# Patient Record
(demographics unavailable — no encounter records)

---

## 2024-12-04 NOTE — ASSESSMENT
[FreeTextEntry1] : The patient is a 58-year-old female with left left Achilles tendinosis with thickening of the tendon.  I reviewed the examination, diagnosis, MRI and treatment options.  She is improving with physical therapy and I recommend she continue with therapy.  She understands she is a slight increased risk for rupture.  If she notices any significant sudden pain or pop she should contact me.  She will continue with therapy and follow-up me in about 2 months if her symptoms persist or worsen.  All questions were answered.  She will modify activities accordingly work with the therapist.

## 2024-12-04 NOTE — PHYSICAL EXAM
[de-identified] : Left ankle examination today demonstrates that there is thickening of the Achilles tendon a few centimeters proximal to the Achilles insertion with some minimal tenderness.  It is clinically intact. [de-identified] : A MRI report of left ankle from October 17, 2024 is interpreted as fusiform thickening of the Achilles tendon measuring up to 1 cm, consistent with moderate tendinosis.  This corresponds to the palpable area of concern.  Other findings are reported on the MRI but did not correlate the patient's symptoms.

## 2024-12-04 NOTE — HISTORY OF PRESENT ILLNESS
[FreeTextEntry1] : The patient is a 58-year-old female that notices a bump on her Achilles tendon with some pain.  She first noticed this in June.  She then noticed more symptoms in August when she was walking more.  She started physical therapy 1 month ago and has noticed an improvement.  Her symptoms are related to activity.  She notices a prominence or bump along the Achilles tendon a few centimeters proximal to the insertion.

## 2024-12-31 NOTE — PHYSICAL EXAM
[Scleral Icterus] : No Scleral Icterus [Abdominal  Ascites] : no ascites [Asterixis] : no asterixis observed [Jaundice] : No jaundice [Depression] : no depression [Hallucinations] : ~T no ~M hallucinations [General Appearance - Alert] : alert [General Appearance - In No Acute Distress] : in no acute distress [General Appearance - Well Nourished] : well nourished [General Appearance - Well Developed] : well developed [General Appearance - Well-Appearing] : healthy appearing [Sclera] : the sclera and conjunctiva were normal [] : no respiratory distress [Exaggerated Use Of Accessory Muscles For Inspiration] : no accessory muscle use [Auscultation Breath Sounds / Voice Sounds] : lungs were clear to auscultation bilaterally [Heart Rate And Rhythm] : heart rate was normal and rhythm regular [Edema] : there was no peripheral edema [Bowel Sounds] : normal bowel sounds [Abdomen Soft] : soft [Abdomen Tenderness] : non-tender [Abdomen Mass (___ Cm)] : no abdominal mass palpated [FreeTextEntry1] : central adiposity [Abnormal Walk] : normal gait [Skin Color & Pigmentation] : normal skin color and pigmentation [No Focal Deficits] : no focal deficits [Oriented To Time, Place, And Person] : oriented to person, place, and time [Affect] : the affect was normal [Impaired Insight] : insight and judgment were intact

## 2024-12-31 NOTE — HISTORY OF PRESENT ILLNESS
[FreeTextEntry1] : Pt is a 58 year old female with PMH class I obesity (BMI 32.8), HTN, HLD, hypothyroidism, who presents for initial visit for evaluation/management of abnormal liver imaging. PCP: Jovany Ferreira GI: Mallory Guzman MD Pharmacy: [facility]   PRIOR DATA: -11/26/24: Went to [facility] ED for R flank pain rad to RUQ/R groin. Advised to follow up with her GI DrWilber Guzman. WBC 5.78, Hgb 14.3, Plt 92. Na 139, K 3.6, Cr 0.91. AST/ALT 19/23, TB 0.5, ALP 58, Alb 4.6, TP 7.2. Lipase 29.  UA neg.  CT A/P w IV contrast only (to rule out kidney stone, pain rad to groin) => Diffuse heterogeneity right hepatic lobe. No biliary ductal dilatation. GB wnl, Spleen wnl. Pancreas wnl. Kidneys/ureters/bladder wnl. IMPRESSION: Diffuse heterogeneity of the right hepatic lobe of uncertain etiology; recommend triple phase CT or MRI. Imaging reviewed - pt appears to have significant stool burden on CT 11/2024. -11/28/24: Pt went to [facility] for severe R abd pain.  UA neg. Trop neg. WBC 4.9, hgb 13.8, Plt 59. Na 138, K 3.6, Cr 1.19. ASt/ALT 17/22, TB 0.4, ALP 53, Alb 4.2, TP 6.9. LIpase 27.  CT A/P w IV contrast (for eval of R flank pain) => IMPRESSION: 1) No acute abdominopelvic pathology. 2) Mild hepatomegaly and hepatic steatosis. GB and biliary tree wnl. Spleen wnl. Pancreas wnl. No bowel obstruction. No adenopathy or ascites.  Today 12/31/24: Pt had presented to PCP with RUQ pain (described as stabbing). Referred to [facility] ED for eval on 11/26/2024, rule out kidney stone vs. gallstone. Pt had abd CT and was told there is a ?liver mass and asked to f/u with us. Went to [facility] ED on 11/28 due to worsening RUQ pain, had repeat CT A/P w IV contrast which showed hepatomegaly, steatosis. On 11/30 pt developed a vesicular rash in distribution of a dermatome along upper R abd.. Went to Urgent care, dx with shingles, and was started on Valtrex. Pt was on Valtrex for 2 weeks. Shingles cleared and but still on gabapentin for pain. Because of Shingles, she missed the early December appointment with us.  Today reports ongoing residual pain from Shingles rash. Denies f/c, cp, sob, cough, abd pain, n/v/d/c, hematochezia/melena, dysuria. Pt will see PCP in 2 weeks if the pain persists.    24h diet recall: Bfast: glass OJ, Ni[place]an toast Lunch: skipped Dinner: salad with chicken Snacks: fruit pies Drinks: water Exercise: no exercise since dev tendinosis in 10/2024. Prior to that, she walked 7000 steps/day.   Weight: Today's wt 186.   Liver Hx: Age of diagnosis: Had not been dx with hepatic steatosis prior to recent CT A/P in 11/2024. Prior liver biopsy: never Prior HE: never Prior GI bleed: never Weight hx: Peak lifetime weight 200lb at age 55 till 1 month ago when she had Shingles . Was thin as a child, <120 as a  teen.   Family Hx: Mother hypothyroidism, celiac disease, and neutropenic. Father lung cancer.    Social Hx Tobacco: never Alcohol: a glass of wine once a week. Denies hx of heavy alcohol use. Drugs: never Works as used to be director of client service Lives with son Born in , moved to [place] as a child at age 3, [place] 21   PSH:2 c sections (2003, 2006)   Last EGD: 2022. no problem as per pt.  Last COL: 2022 no polyps. Advised to repeat in 5 years.  Allergies: NDKA  CURRENT MEDS: Valtrex (took for 2 weeks) Gabapentin 300mg TID Artovastatin 20mg wd Losartan 25mg qd Hydrocholothiazide 25mg daily synthroid 100mcg daily oxycodone

## 2024-12-31 NOTE — ASSESSMENT
[FreeTextEntry1] : Pt is a 58 year old female with PMH class I obesity (BMI 32.8), HTN, HLD, hypothyroidism, who presents for initial visit for evaluation/management of abnormal liver imaging.  #Hepatomegaly, hepatic steatosis: - Pt had 2 CT A/P w IV contrast done: CT 11/26/24 at [facility] was read as "Diffuse heterogeneity of the right hepatic lobe of uncertain etiology; recommend triple phase CT or MRI." CT 11/28/24 at [facility] was read as "Mild hepatomegaly and hepatic steatosis." - Pt denies family hx of liver disease. - LFTs wnl in 11/28/24. - Pt has risk factors for MASLD: obesity, HTN, HLD.  Plan: - Fibroscan was performed in the office today:  (S0), 5.1 kPa (F0), IQR 10%. The Fibroscan results were discussed with the pt. Review of the Fibroscan images suggest this may have not been an optimal study (should have parallel margin shear wave for all measurements), so can consider repeating at future visit. - Check MELD labs, chronic liver disease workup, hepatitis A/B/C serologies. - Counseled pt to avoid hepatotoxins, alcohol use, herbal supplements. Limit acetaminophen to 2g/day.  #Class I obesity: - Counseled pt on dietary/exercise lifestyle modifications for weight loss. Discussed Mediterranean diet.   RTC in 4 weeks. Consider repeat Fibroscan at next appt.

## 2024-12-31 NOTE — HISTORY OF PRESENT ILLNESS
[FreeTextEntry1] : Pt is a 58 year old female with PMH class I obesity (BMI 32.8), HTN, HLD, hypothyroidism, who presents for initial visit for evaluation/management of abnormal liver imaging. PCP: Jovany Ferreira GI: Mallory Guzman MD Pharmacy: [facility]   PRIOR DATA: -11/26/24: Went to [facility] ED for R flank pain rad to RUQ/R groin. Advised to follow up with her GI DrWilber Guzman. WBC 5.78, Hgb 14.3, Plt 92. Na 139, K 3.6, Cr 0.91. AST/ALT 19/23, TB 0.5, ALP 58, Alb 4.6, TP 7.2. Lipase 29.  UA neg.  CT A/P w IV contrast only (to rule out kidney stone, pain rad to groin) => Diffuse heterogeneity right hepatic lobe. No biliary ductal dilatation. GB wnl, Spleen wnl. Pancreas wnl. Kidneys/ureters/bladder wnl. IMPRESSION: Diffuse heterogeneity of the right hepatic lobe of uncertain etiology; recommend triple phase CT or MRI. Imaging reviewed - pt appears to have significant stool burden on CT 11/2024. -11/28/24: Pt went to [facility] for severe R abd pain.  UA neg. Trop neg. WBC 4.9, hgb 13.8, Plt 59. Na 138, K 3.6, Cr 1.19. ASt/ALT 17/22, TB 0.4, ALP 53, Alb 4.2, TP 6.9. LIpase 27.  CT A/P w IV contrast (for eval of R flank pain) => IMPRESSION: 1) No acute abdominopelvic pathology. 2) Mild hepatomegaly and hepatic steatosis. GB and biliary tree wnl. Spleen wnl. Pancreas wnl. No bowel obstruction. No adenopathy or ascites.  Today 12/31/24: Pt had presented to PCP with RUQ pain (described as stabbing). Referred to [facility] ED for eval on 11/26/2024, rule out kidney stone vs. gallstone. Pt had abd CT and was told there is a ?liver mass and asked to f/u with us. Went to [facility] ED on 11/28 due to worsening RUQ pain, had repeat CT A/P w IV contrast which showed hepatomegaly, steatosis. On 11/30 pt developed a vesicular rash in distribution of a dermatome along upper R abd.. Went to Urgent care, dx with shingles, and was started on Valtrex. Pt was on Valtrex for 2 weeks. Shingles cleared and but still on gabapentin for pain. Because of Shingles, she missed the early December appointment with us.  Today reports ongoing residual pain from Shingles rash. Denies f/c, cp, sob, cough, abd pain, n/v/d/c, hematochezia/melena, dysuria. Pt will see PCP in 2 weeks if the pain persists.    24h diet recall: Bfast: glass OJ, Bhutanese toast Lunch: skipped Dinner: salad with chicken Snacks: fruit pies Drinks: water Exercise: no exercise since dev tendinosis in 10/2024. Prior to that, she walked 7000 steps/day.   Weight: Today's wt 186.   Liver Hx: Age of diagnosis: Had not been dx with hepatic steatosis prior to recent CT A/P in 11/2024. Prior liver biopsy: never Prior HE: never Prior GI bleed: never Weight hx: Peak lifetime weight 200lb at age 55 till 1 month ago when she had Shingles . Was thin as a child, <120 as a  teen.   Family Hx: Mother hypothyroidism, celiac disease, and neutropenic. Father lung cancer.    Social Hx Tobacco: never Alcohol: a glass of wine once a week. Denies hx of heavy alcohol use. 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 no problem as per pt.  Last COL: 2022 no polyps. Advised to repeat in 5 years.  Allergies: NDKA  CURRENT MEDS: Valtrex (took for 2 weeks) Gabapentin 300mg TID Artovastatin 20mg wd Losartan 25mg qd Hydrocholothiazide 25mg daily synthroid 100mcg daily oxycodone

## 2025-01-27 NOTE — PHYSICAL EXAM
[General Appearance - Alert] : alert [General Appearance - In No Acute Distress] : in no acute distress [General Appearance - Well Nourished] : well nourished [General Appearance - Well Developed] : well developed [General Appearance - Well-Appearing] : healthy appearing [Sclera] : the sclera and conjunctiva were normal [] : no respiratory distress [Exaggerated Use Of Accessory Muscles For Inspiration] : no accessory muscle use [Auscultation Breath Sounds / Voice Sounds] : lungs were clear to auscultation bilaterally [Heart Rate And Rhythm] : heart rate was normal and rhythm regular [Edema] : there was no peripheral edema [Bowel Sounds] : normal bowel sounds [Abdomen Soft] : soft [Abdomen Tenderness] : non-tender [Abdomen Mass (___ Cm)] : no abdominal mass palpated [Abnormal Walk] : normal gait [Skin Color & Pigmentation] : normal skin color and pigmentation [No Focal Deficits] : no focal deficits [Oriented To Time, Place, And Person] : oriented to person, place, and time [Impaired Insight] : insight and judgment were intact [Affect] : the affect was normal [Scleral Icterus] : No Scleral Icterus [Abdominal  Ascites] : no ascites [Asterixis] : no asterixis observed [Jaundice] : No jaundice [Depression] : no depression [Hallucinations] : ~T no ~M hallucinations [FreeTextEntry1] : central adiposity

## 2025-01-27 NOTE — HISTORY OF PRESENT ILLNESS
[FreeTextEntry1] : Pt is a 58 year old female with PMH class I obesity (BMI 32.8), HTN, HLD, hypothyroidism, chronic thrombocytopenia, who presents for follow up visit for evaluation/management of abnormal liver imaging. PCP: Jovany Ferreira GI: Mallory Guzman MD Heme: Dr. Diana Rossi (follows for thrombocytopenia q6mo), has had thrombocytopenia for > 5 years. Pharmacy: [facility]   PRIOR DATA: -11/26/24: Went to [facility] ED for R flank pain rad to RUQ/R groin. Advised to follow up with her GI Dr. Mallory Guzman. WBC 5.78, Hgb 14.3, Plt 92. Na 139, K 3.6, Cr 0.91. AST/ALT 19/23, TB 0.5, ALP 58, Alb 4.6, TP 7.2. Lipase 29.  UA neg.  CT A/P w IV contrast only (to rule out kidney stone, pain rad to groin) => Diffuse heterogeneity right hepatic lobe. No biliary ductal dilatation. GB wnl, Spleen wnl. Pancreas wnl. Kidneys/ureters/bladder wnl. IMPRESSION: Diffuse heterogeneity of the right hepatic lobe of uncertain etiology; recommend triple phase CT or MRI. Imaging reviewed - pt appears to have significant stool burden on CT 11/2024. -11/28/24: Pt went to [facility] for severe R abd pain.  UA neg. Trop neg. WBC 4.9, hgb 13.8, Plt 59. Na 138, K 3.6, Cr 1.19. ASt/ALT 17/22, TB 0.4, ALP 53, Alb 4.2, TP 6.9. Lipase 27.  CT A/P w IV contrast (for eval of R flank pain) => IMPRESSION: 1) No acute abdominopelvic pathology. 2) Mild hepatomegaly and hepatic steatosis. GB and biliary tree wnl. Spleen wnl. Pancreas wnl. No bowel obstruction. No adenopathy or ascites. -12/31/24: Seen for initial hep appt.  Fibroscan =>  (S0), 5.1 kPa (F0), IQR 10%. Review of the Fibroscan images suggest this may have not been an optimal study (should have parallel margin shear wave for all measurements), so can consider repeating at future visit. WBC 4.81, Hgb 14.9, Plt 83. Na 141, K 4.2, Cr 0.87. AST/ALT 12/16, TB 0.5, ALP 56, Alb 4.8, TP 7.3. INR 0.95. AFP 2.6. JEANETTE neg, AMA neg, ASMA neg, IgG quant 888 (wnl), IgM quant 169 (wnl), IgA quant 214 (wnl). Ferritin 272. A1AT 120 (wnl). Ceruloplasmin 23 (wnl). TTG IgA neg. HAV IgG neg (HAV nonimmune). HBcAb total neg, HBsAg neg, HBsAb quant < 3.0 (HBV nonimmune). HCV Ab neg.   Interval Hx 1/27/25: Current meds: Atorvastatin 20mg daily. Losartan 25mg daily, HCTZ 25mg daily. Levothyroxine 100mcg daily. Gabapentin 600/300/600 (increased from 300mg TID 2w ago). Oxycodone PRN (takes 1 pill/week when pain is severe). Tylenol 1000mg daily. MVI daily, Vit B12 daily. Denies use of herbals/supplements currently. Tried acupuncture on Saturday for postherpetic neuralgia, hasn't noted an improvement yet. Has another session on Wednesday. 8-9/10 pain in RUQ, burning, postherpetic neuralgia. Hasn't noted significant improvement on higher dose of gabapentin. Denies f/c, cp, sob, cough, abd pain, n/v/d/c, hematochezia/melena, dysuria. Has 1-2 BMs/day. In 2 weeks, if pain not improved, plan for nerve blocker injection by Pain Management in [place], [place] (Dr. Stacie Méndez).  24h diet recall: Bfast: none Lunch: tuna fish sandwich (foccacia bread) Dinner: grilled salmon, broccoli Snacks: olive oil citrus mini bundt cake Drinks: tea (black breakfast tea) with 2% milk, coffee with milk, water (3 bottles) Exercise: none due to fatigue due to poor sleep from postherpetic neuralgia Weight: Wt 186# in 12/31/24. Today's wt 185#. Last ate at 830am today.  [ ] med/herbal rec [ ] Repeat Fibroscan [ ] does pt have a hematologist for eval thrombocytopenia?   Prior hx (as per initial visit on 12/31/24): Pt had presented to PCP with RUQ pain (described as stabbing). Referred to [facility] ED for eval on 11/26/2024, rule out kidney stone vs. gallstone. Pt had abd CT and was told there is a ?liver mass and asked to f/u with us. Went to [facility] ED on 11/28 due to worsening RUQ pain, had repeat CT A/P w IV contrast which showed hepatomegaly, steatosis. On 11/30 pt developed a vesicular rash in distribution of a dermatome along upper R abd.. Went to Urgent care, dx with shingles, and was started on Valtrex. Pt was on Valtrex for 2 weeks. Shingles cleared and but still on gabapentin for pain. Because of Shingles, she missed the early December appointment with us.  Today reports ongoing residual pain from Shingles rash. Denies f/c, cp, sob, cough, abd pain, n/v/d/c, hematochezia/melena, dysuria. Pt will see PCP in 2 weeks if the pain persists.  In between jobs currently.   24h diet recall: Bfast: glass OJ, Romanian toast Lunch: skipped Dinner: salad with chicken Snacks: fruit pies Drinks: water Exercise: no exercise since dev tendinosis in 10/2024. Prior to that, she walked 7000 steps/day.   Weight: Today's wt 186.   Liver Hx: Age of diagnosis: Had not been dx with hepatic steatosis prior to recent CT A/P in 11/2024. Prior liver biopsy: never Prior HE: never Prior GI bleed: never Weight hx: Peak lifetime weight 200lb at age 55 till 1 month ago when she had Shingles . Was thin as a child, <120 as a  teen.   Family Hx: Mother hypothyroidism, celiac disease, and neutropenic. Father lung cancer.    Social Hx Tobacco: never Alcohol: a glass of wine once a week. Denies hx of heavy alcohol use. 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 no problem as per pt.  Last COL: 2022 no polyps. Advised to repeat in 5 years.  Allergies: NDKA  CURRENT MEDS: Valtrex (took for 2 weeks) Gabapentin 300mg TID Artovastatin 20mg qd Losartan 25mg qd Hydrocholothiazide 25mg daily synthroid 100mcg daily oxycodone

## 2025-01-27 NOTE — ASSESSMENT
[FreeTextEntry1] : Pt is a 58 year old female with PMH class I obesity (BMI 32.8), HTN, HLD, hypothyroidism, who presents for initial visit for evaluation/management of abnormal liver imaging.  #Hepatomegaly, hepatic steatosis: - Pt had 2 CT A/P w IV contrast done: CT 11/26/24 at [facility] was read as "Diffuse heterogeneity of the right hepatic lobe of uncertain etiology; recommend triple phase CT or MRI." CT 11/28/24 at [facility] was read as "Mild hepatomegaly and hepatic steatosis." - Pt denies family hx of liver disease. - LFTs wnl in 11/28/24. - Pt has risk factors for MASLD: obesity, HTN, HLD.  Plan: - Fibroscan was performed in the office today:  (S0), 5.1 kPa (F0), IQR 10%. The Fibroscan results were discussed with the pt. Review of the Fibroscan images suggest this may have not been an optimal study (should have parallel margin shear wave for all measurements), so can consider repeating at future visit. - Check MELD labs, chronic liver disease workup, hepatitis A/B/C serologies. - Counseled pt to avoid hepatotoxins, alcohol use, herbal supplements. Limit acetaminophen to 2g/day.  #Class I obesity: - Counseled pt on dietary/exercise lifestyle modifications for weight loss. Discussed Mediterranean diet.   RTC in 4 weeks. Consider repeat Fibroscan at next appt.